# Patient Record
Sex: FEMALE | URBAN - METROPOLITAN AREA
[De-identification: names, ages, dates, MRNs, and addresses within clinical notes are randomized per-mention and may not be internally consistent; named-entity substitution may affect disease eponyms.]

---

## 2021-01-25 ENCOUNTER — TELEPHONE (OUTPATIENT)
Dept: FAMILY MEDICINE CLINIC | Facility: CLINIC | Age: 66
End: 2021-01-25

## 2021-01-25 RX ORDER — OLMESARTAN MEDOXOMIL 40 MG/1
40 TABLET ORAL DAILY
Qty: 30 TABLET | Refills: 0 | Status: SHIPPED | OUTPATIENT
Start: 2021-01-25

## 2021-01-25 RX ORDER — OLMESARTAN MEDOXOMIL 40 MG/1
40 TABLET ORAL DAILY
COMMUNITY
End: 2021-01-25

## 2021-01-25 NOTE — TELEPHONE ENCOUNTER
Patient needs a refill before her visit on Monday. States she will run out off the med before her visit.

## 2021-01-25 NOTE — TELEPHONE ENCOUNTER
RN returning pt's call. Pt reports needing refill on blood pressure medication. Pt states she will run out before she able to see [facility] on 2/1/21. RN verified mediation and pharmacy with pt. RX sent, routed to [facility]. Pt has no other concerns at this time.